# Patient Record
Sex: FEMALE | Race: OTHER | HISPANIC OR LATINO
[De-identification: names, ages, dates, MRNs, and addresses within clinical notes are randomized per-mention and may not be internally consistent; named-entity substitution may affect disease eponyms.]

---

## 2020-09-03 ENCOUNTER — APPOINTMENT (OUTPATIENT)
Dept: NEUROLOGY | Facility: CLINIC | Age: 85
End: 2020-09-03
Payer: MEDICARE

## 2020-09-03 VITALS — TEMPERATURE: 97.1 F

## 2020-09-03 VITALS — HEART RATE: 111 BPM | SYSTOLIC BLOOD PRESSURE: 129 MMHG | DIASTOLIC BLOOD PRESSURE: 65 MMHG

## 2020-09-03 DIAGNOSIS — Z86.79 PERSONAL HISTORY OF OTHER DISEASES OF THE CIRCULATORY SYSTEM: ICD-10-CM

## 2020-09-03 DIAGNOSIS — I69.30 UNSPECIFIED SEQUELAE OF CEREBRAL INFARCTION: ICD-10-CM

## 2020-09-03 DIAGNOSIS — Z86.39 PERSONAL HISTORY OF OTHER ENDOCRINE, NUTRITIONAL AND METABOLIC DISEASE: ICD-10-CM

## 2020-09-03 DIAGNOSIS — Z86.69 PERSONAL HISTORY OF OTHER DISEASES OF THE NERVOUS SYSTEM AND SENSE ORGANS: ICD-10-CM

## 2020-09-03 PROCEDURE — 99215 OFFICE O/P EST HI 40 MIN: CPT

## 2020-09-03 RX ADMIN — ASPIRIN 1 MG: 81 TABLET, CHEWABLE ORAL at 00:00

## 2020-09-14 NOTE — HISTORY OF PRESENT ILLNESS
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 Family is unaware of the detailed workup at the time so proposed mechanism and etiology not known at this time. Patient has been on ASA even before the stroke. Patient was able to travel to the [place] in July 2020, after COVID travel restrictions were released. In August, patient was hospitalized for suspected grand mal seizure and started on Keppra 750mg BID and has been remained symptom free since the episode. Other pertinent hx includes HLD, SAV non on CPAP due non compliance, b/l cataract surgery. pre-MRS 0 with full functioning of ADLs. Post-stroke MRS now 4, mobile only with use of wheelchair, needs assistance with all ADLs, however still maintains adequate speech and swallowing function. Patient received 3 months of PT (3x weekly) post-stroke while in the [place]. Labs recently taken by PCP 2 weeks ago, will be faxed in by daughter. \par \par \par

## 2020-09-14 NOTE — ASSESSMENT
[FreeTextEntry1] : 87 year old woman w/ chronic left spastic hemiparesis +/- LHH, now wheelchair bound w/ MRS 4 likely 2/2 reported right MCA territory ischemic infarct diagnosed in October 2019, while residing in the [place]. Proposed mechanism and etiology not known at this time. Patient was on ASA even before the stroke.  In August, patient was hospitalized for suspected grand mal seizure and started on Keppra 750mg BID and has been remained symptom free since the episode. Other pertinent hx includes HLD, SAV non on CPAP due non compliance, b/l cataract surgery. Labs recently taken by PCP 2 weeks ago, will be faxed in by daughter.\par \par Plan: \par -Labs and previous records to be brought in by Daughter (A1c, Lipid panel, etc) \par -MRI brain w/o contrast\par -MRA brain w/o contrast\par -carotid Doppler's\par -TTE \par -rEEG \par -c/w ASA 81mg for now \par -c/w secondary stroke prevention w/ risk factor control\par -c/w Keppra 750mg BID for now w/ referral to Epilepsy for long term management\par -referral to movement for +/- Botox therapy for spastic hemiparesis \par -referral to PT/OT Speech/Swallow for continued therapy \par -will need to follow up w/ patient's cardiologist for results of TTE. \par -patient to follow up w/ Argenis Melara/Dr. Keller in 3 months

## 2020-09-14 NOTE — PHYSICAL EXAM
[FreeTextEntry1] : General: Woman, appears stated age, no distress, wheelchair \par MS: AxO3 (person, place, year), follows all commands \par Language: fluent speech and comprehension ([place] Verdean), names objects \par CN: b/l cataracts noted, PERRLA, EOMI no nystagmus, decreased V1-3 on left vs. normal right, no facial droop, no dysarthria, decreased blink to threat on left vs. normal right, tongue midline, hearing grossly in tact \par Motor: 2/5 LUE/LLE, 5/5 RUE/RLE, increased tone with left elbow flexion, left dorsi/planter flexion. \par Sensory: decreased touch on LUE/LLE vs. normal right side of body \par Coord: normal FTN on right, unable to perform on left \par Gait: deferred

## 2020-09-23 ENCOUNTER — APPOINTMENT (OUTPATIENT)
Dept: NEUROLOGY | Facility: CLINIC | Age: 85
End: 2020-09-23
Payer: MEDICARE

## 2020-09-23 ENCOUNTER — TRANSCRIPTION ENCOUNTER (OUTPATIENT)
Age: 85
End: 2020-09-23

## 2020-09-23 VITALS
BODY MASS INDEX: 30.31 KG/M2 | SYSTOLIC BLOOD PRESSURE: 94 MMHG | DIASTOLIC BLOOD PRESSURE: 49 MMHG | HEIGHT: 68 IN | WEIGHT: 200 LBS | HEART RATE: 43 BPM

## 2020-09-23 VITALS — TEMPERATURE: 97.2 F

## 2020-09-23 PROCEDURE — 99214 OFFICE O/P EST MOD 30 MIN: CPT

## 2020-09-23 PROCEDURE — 99205 OFFICE O/P NEW HI 60 MIN: CPT

## 2020-09-23 NOTE — HISTORY OF PRESENT ILLNESS
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 Family is unaware of the detailed workup at the time so proposed mechanism and etiology not known at this time. Patient has been on ASA even before the stroke. Patient was able [place] the [place] in [place], after COVID travel restrictions were released. In August, patient was hospitalized for suspected grand mal seizure and started on Keppra 750mg BID and has been remained symptom free since the episode. Other pertinent hx includes HLD, SAV non on CPAP due non compliance, b/l cataract surgery. pre-MRS 0 with full functioning of ADLs. Post-stroke MRS now 4, mobile only with use of wheelchair, needs assistance with all ADLs, however still maintains adequate speech and swallowing function.\par She reports tightness and pain in The Left upper and lower limb. she needs to use her R hand to open the left for cleaning etc\par \par \par

## 2020-09-23 NOTE — PHYSICAL EXAM
[General Appearance - Alert] : alert [General Appearance - In No Acute Distress] : in no acute distress [Affect] : the affect was normal [No APD] : no afferent pupillary defect [Outer Ear] : the ears and nose were normal in appearance [Neck Appearance] : the appearance of the neck was normal [] : no respiratory distress [Edema] : there was no peripheral edema [Abdomen Soft] : soft [Nail Clubbing] : no clubbing  or cyanosis of the fingernails [FreeTextEntry1] : d [Skin Color & Pigmentation] : normal skin color and pigmentation

## 2020-09-23 NOTE — DISCUSSION/SUMMARY
[FreeTextEntry1] : this is an 88 y/o female with L spastic hemiparesis. patient is experiencing tightness and pain in LUE. interested in BTX injections.\par risks and anticipated benefits d/w patient and family\par wishes to proceed\par will get PAR for 200 units\par all questions were addressed and answered

## 2020-10-07 ENCOUNTER — APPOINTMENT (OUTPATIENT)
Dept: NEUROLOGY | Facility: CLINIC | Age: 85
End: 2020-10-07
Payer: MEDICARE

## 2020-10-07 VITALS — TEMPERATURE: 97.2 F

## 2020-10-07 VITALS
DIASTOLIC BLOOD PRESSURE: 76 MMHG | HEIGHT: 68 IN | HEART RATE: 94 BPM | SYSTOLIC BLOOD PRESSURE: 135 MMHG | BODY MASS INDEX: 30.31 KG/M2 | WEIGHT: 200 LBS

## 2020-10-07 DIAGNOSIS — R53.1 WEAKNESS: ICD-10-CM

## 2020-10-07 DIAGNOSIS — R56.9 UNSPECIFIED CONVULSIONS: ICD-10-CM

## 2020-10-07 DIAGNOSIS — G40.409 OTHER GENERALIZED EPILEPSY AND EPILEPTIC SYNDROMES, NOT INTRACTABLE, W/OUT STATUS EPILEPTICUS: ICD-10-CM

## 2020-10-07 PROCEDURE — 95816 EEG AWAKE AND DROWSY: CPT

## 2020-10-07 PROCEDURE — 99214 OFFICE O/P EST MOD 30 MIN: CPT

## 2020-10-07 PROCEDURE — 93880 EXTRACRANIAL BILAT STUDY: CPT

## 2020-10-07 PROCEDURE — 93888 INTRACRANIAL LIMITED STUDY: CPT

## 2020-10-09 ENCOUNTER — APPOINTMENT (OUTPATIENT)
Dept: NEUROLOGY | Facility: CLINIC | Age: 85
End: 2020-10-09
Payer: MEDICARE

## 2020-10-09 VITALS — TEMPERATURE: 97.2 F

## 2020-10-09 PROBLEM — R53.1 WEAKNESS OF LEFT SIDE OF BODY: Status: ACTIVE | Noted: 2020-09-03

## 2020-10-09 PROBLEM — G40.409 GRAND MAL SEIZURE: Status: ACTIVE | Noted: 2020-09-03

## 2020-10-09 PROBLEM — R56.9 SEIZURE: Status: ACTIVE | Noted: 2020-10-07

## 2020-10-09 PROCEDURE — 99214 OFFICE O/P EST MOD 30 MIN: CPT | Mod: 25

## 2020-10-09 PROCEDURE — 64642 CHEMODENERV 1 EXTREMITY 1-4: CPT

## 2020-10-09 NOTE — PHYSICAL EXAM
[FreeTextEntry1] : General: Woman, appears stated age, no distress, wheelchair \par MS: AxO3 (person, place, year), follows all commands in native language\par unable to move LUE spontaneously\par the arm is flexed at elbow, wrist, PIP joints, thumb flexed in palm. at elbow and wrist spasticity is félix grade 3, finger flexors and thumb grade 1 [General Appearance - Alert] : alert [General Appearance - In No Acute Distress] : in no acute distress [Affect] : the affect was normal [No APD] : no afferent pupillary defect [Outer Ear] : the ears and nose were normal in appearance [Neck Appearance] : the appearance of the neck was normal [] : no respiratory distress [Edema] : there was no peripheral edema [Abdomen Soft] : soft [Nail Clubbing] : no clubbing  or cyanosis of the fingernails [Skin Color & Pigmentation] : normal skin color and pigmentation

## 2020-10-09 NOTE — DISCUSSION/SUMMARY
[FreeTextEntry1] : 87 year old F with PMH of R MCA stroke 2019, HTN, one episode of seizure was referred by ED for eval of epilepsy. She was taken to [facility] where she was started on Keppra 750 BID. Routine EEG showed no seizures. \par \par Impression \par \par Event is concerning for seizure and she meets diagnosis of epilepsy due to her R MCA stroke \par \par Plan \par \par Decrease Keppra from 750 BID to 250 BID ( initially 500 BID for one week and after that 250 BID) \par MRI brain (ordered by stroke team already \par Ambulatory EEG for 48 hours

## 2020-10-09 NOTE — DISCUSSION/SUMMARY
[FreeTextEntry1] : this is an 88 y/o female with L spastic hemiparesis. patient is experiencing tightness and pain in LUE. interested in BTX injections.\par risks and anticipated benefits d/w patient and family\par wishes to proceed\par injected as above\par PT\par RTC 1 month\par all questions were addressed and answered

## 2020-10-09 NOTE — PROCEDURE
[FreeTextEntry1] : Area of left upper extremity to be injected was cleansed with alcohol wipes. Botox was mixed in a ratio of 10 units per cc of normal saline. I injected as follows\par \par Left biceps 50 units\par Left flexor carpi radialis 30 units\par Left flexor carpi ulnaris 30 units\par Left flexor digitorum superficialis 30 units\par Left flexor digitorum profundus 30 units\par Left flexor opponens pollicis 20 units\par \par Units injected 190\par Wasted 10\par lot number [id number]\par Expiration June 2023\par

## 2020-10-09 NOTE — HISTORY OF PRESENT ILLNESS
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 Family is unaware of the detailed workup at the time so proposed mechanism and etiology not known at this time. Patient has been on ASA even before the stroke. Patient was [place] in [place], after COVID travel restrictions were released. In August, patient was hospitalized for suspected grand mal seizure and started on Keppra 750mg BID and has been remained symptom free since the episode. Other pertinent hx includes HLD, SAV non on CPAP due non compliance, b/l cataract surgery. pre-MRS 0 with full functioning of ADLs. Post-stroke MRS now 4, mobile only with use of wheelchair, needs assistance with all ADLs, however still maintains adequate speech and swallowing function.\par She reports tightness and pain in The Left upper and lower limb. she needs to use her R hand to open the left for cleaning etc\par \par Interval history: accompanied by granddaughter today. no changes. still with LUE spasticity. here for BTX inj\par \par \par

## 2020-10-09 NOTE — HISTORY OF PRESENT ILLNESS
[FreeTextEntry1] : 87 year old F with PMH of R MCA stroke 2019, HTN, one episode of seizure was referred by ED for eval of epilepsy. As per family member, she had her first seizure in august 2020. She was found starring into space for several seconds followed by turning of her body on the right side which was followed by shaking which lasted for 2-3 minutes. She was back to baseline in ambulance. She was taken to [facility] where she was started on Keppra 750 BID. As per family she had no other event however they feel that keppra 750 BID is high dose for her. Family members described that she is drowsy and less interactive after taking her keppra dose.

## 2020-10-13 ENCOUNTER — APPOINTMENT (OUTPATIENT)
Age: 85
End: 2020-10-13
Payer: MEDICARE

## 2020-10-13 ENCOUNTER — RESULT REVIEW (OUTPATIENT)
Age: 85
End: 2020-10-13

## 2020-10-13 ENCOUNTER — OUTPATIENT (OUTPATIENT)
Dept: OUTPATIENT SERVICES | Facility: HOSPITAL | Age: 85
LOS: 1 days | End: 2020-10-13
Payer: MEDICARE

## 2020-10-13 DIAGNOSIS — I69.30 UNSPECIFIED SEQUELAE OF CEREBRAL INFARCTION: ICD-10-CM

## 2020-10-13 PROCEDURE — 70551 MRI BRAIN STEM W/O DYE: CPT | Mod: 26

## 2020-10-13 PROCEDURE — 70551 MRI BRAIN STEM W/O DYE: CPT

## 2020-10-13 PROCEDURE — 70544 MR ANGIOGRAPHY HEAD W/O DYE: CPT

## 2020-10-13 PROCEDURE — 70544 MR ANGIOGRAPHY HEAD W/O DYE: CPT | Mod: 26,59

## 2020-10-15 ENCOUNTER — APPOINTMENT (OUTPATIENT)
Dept: NEUROLOGY | Facility: CLINIC | Age: 85
End: 2020-10-15
Payer: MEDICARE

## 2020-10-15 PROCEDURE — 95816 EEG AWAKE AND DROWSY: CPT

## 2020-10-16 ENCOUNTER — APPOINTMENT (OUTPATIENT)
Dept: NEUROLOGY | Facility: CLINIC | Age: 85
End: 2020-10-16

## 2020-10-16 PROCEDURE — 95708 EEG WO VID EA 12-26HR UNMNTR: CPT

## 2020-10-17 PROCEDURE — 95700 EEG CONT REC W/VID EEG TECH: CPT

## 2020-10-17 PROCEDURE — 95721 EEG PHY/QHP>36<60 HR W/O VID: CPT

## 2020-10-17 PROCEDURE — 95708 EEG WO VID EA 12-26HR UNMNTR: CPT

## 2020-10-17 PROCEDURE — 95705 EEG W/O VID 2-12 HR UNMNTR: CPT

## 2020-10-21 ENCOUNTER — NON-APPOINTMENT (OUTPATIENT)
Age: 85
End: 2020-10-21

## 2020-11-05 ENCOUNTER — TRANSCRIPTION ENCOUNTER (OUTPATIENT)
Age: 85
End: 2020-11-05

## 2020-11-05 RX ORDER — LEVETIRACETAM 250 MG/1
250 TABLET, FILM COATED ORAL
Qty: 80 | Refills: 0 | Status: ACTIVE | COMMUNITY
Start: 2020-10-07 | End: 1900-01-01

## 2020-11-13 ENCOUNTER — APPOINTMENT (OUTPATIENT)
Dept: NEUROLOGY | Facility: CLINIC | Age: 85
End: 2020-11-13
Payer: MEDICARE

## 2020-11-13 VITALS
WEIGHT: 200 LBS | SYSTOLIC BLOOD PRESSURE: 144 MMHG | HEIGHT: 67 IN | HEART RATE: 104 BPM | DIASTOLIC BLOOD PRESSURE: 74 MMHG | BODY MASS INDEX: 31.39 KG/M2

## 2020-11-13 VITALS — TEMPERATURE: 95.1 F

## 2020-11-13 DIAGNOSIS — I69.959 HEMIPLEGIA AND HEMIPARESIS FOLLOWING UNSPECIFIED CEREBROVASCULAR DISEASE AFFECTING UNSPECIFIED SIDE: ICD-10-CM

## 2020-11-13 PROCEDURE — 99214 OFFICE O/P EST MOD 30 MIN: CPT

## 2020-11-13 NOTE — DISCUSSION/SUMMARY
[FreeTextEntry1] : this is an 86 y/o female with L spastic hemiparesis. patient is experiencing tightness and pain in LUE. there is reduction in pain, improvement in spasticity was transient. no s/e\par will consider injecting a higher dose next visit\par PT\par RTC 2 month\par all questions were addressed and answered

## 2020-11-13 NOTE — HISTORY OF PRESENT ILLNESS
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 Family is unaware of the detailed workup at the time so proposed mechanism and etiology not known at this time. Patient has been on ASA even before the stroke. Patient was able [place] the [place] in [place], after COVID travel restrictions were released. In August, patient was hospitalized for suspected grand mal seizure and started on Keppra 750mg BID and has been remained symptom free since the episode. Other pertinent hx includes HLD, SAV non on CPAP due non compliance, b/l cataract surgery. pre-MRS 0 with full functioning of ADLs. Post-stroke MRS now 4, mobile only with use of wheelchair, needs assistance with all ADLs, however still maintains adequate speech and swallowing function.\par She reports tightness and pain in The Left upper and lower limb. she needs to use her R hand to open the left for cleaning etc\par \par Interval history: accompanied by granddaughter today. s/p 1 month BTX inj. initially her arm was more relaxed, but now spasticity is returning. pain is much less than before per pt 40-50 %\par no s/e\par \par \par

## 2021-02-18 ENCOUNTER — APPOINTMENT (OUTPATIENT)
Dept: NEUROLOGY | Facility: CLINIC | Age: 86
End: 2021-02-18

## 2021-02-18 ENCOUNTER — TRANSCRIPTION ENCOUNTER (OUTPATIENT)
Age: 86
End: 2021-02-18